# Patient Record
Sex: MALE | Race: WHITE | Employment: FULL TIME | URBAN - METROPOLITAN AREA
[De-identification: names, ages, dates, MRNs, and addresses within clinical notes are randomized per-mention and may not be internally consistent; named-entity substitution may affect disease eponyms.]

---

## 2017-02-10 ENCOUNTER — OFFICE VISIT (OUTPATIENT)
Dept: NEUROLOGY | Facility: CLINIC | Age: 22
End: 2017-02-10

## 2017-02-10 VITALS
SYSTOLIC BLOOD PRESSURE: 118 MMHG | RESPIRATION RATE: 18 BRPM | WEIGHT: 175 LBS | DIASTOLIC BLOOD PRESSURE: 76 MMHG | BODY MASS INDEX: 25.05 KG/M2 | OXYGEN SATURATION: 98 % | HEART RATE: 56 BPM | HEIGHT: 70 IN

## 2017-02-10 DIAGNOSIS — M54.12 CERVICAL RADICULOPATHY AT C7: Primary | ICD-10-CM

## 2017-02-10 DIAGNOSIS — M54.2 BILATERAL NECK PAIN: ICD-10-CM

## 2017-02-10 DIAGNOSIS — M54.6 ACUTE RIGHT-SIDED THORACIC BACK PAIN: ICD-10-CM

## 2017-02-10 PROCEDURE — 99204 OFFICE O/P NEW MOD 45 MIN: CPT | Performed by: PHYSICAL MEDICINE & REHABILITATION

## 2017-02-10 NOTE — PATIENT INSTRUCTIONS
Refill policies:    • Allow 2 business days for refills; controlled substances may take longer.   • Contact your pharmacy at least 5 days prior to running out of medication and have them send an electronic request or submit request through the “request re your physician has recommended that you have a procedure or additional testing performed. [facility]) will contact your insurance carrier to obtain pre-certification or prior authorization.     Unfortunately, CHARLIE has seen an increas

## 2017-02-10 NOTE — PROGRESS NOTES
Cervical Pain H & P    Chief Complaint:  Patient presents with:  Neck Pain: NP Neck pain onset appro 3 months. Pt had no traumatic event, just occurred. Pt pain starts in neck lower to right. Pt stated travel up neck, sometimes sharp pain.  Pt can feel pull ELBOW SURGERY         Family History   Family History   Problem Relation Age of Onset   • Diabetes Father        Social History     Social History   Marital Status: Single  Spouse Name: N/A    Years of Education: N/A  Number of Children: N/A     Occupation lymph nodes. .    Vitals:   02/10/17  1717   BP: 118/76   Pulse: 56   Resp: 18       Cervical Spine:    Posture: mild chin forward superiorly rotated protracted shoulder posture.    Shoulders: Level   Head: In neutral   Spinous Processes Palpations: Non-tend time.    He will follow up in 2-3 months. The patient understands and agrees with the stated plan. Sahara Diallo MD  2/10/2017

## 2017-02-13 ENCOUNTER — TELEPHONE (OUTPATIENT)
Dept: NEUROLOGY | Facility: CLINIC | Age: 22
End: 2017-02-13

## 2017-02-13 NOTE — TELEPHONE ENCOUNTER
[employer] for authorization of approval for MRI C-spine wo. Approval was given with Authorization # [id number] effective 02/13/17 to 03/14/17. Will call Pt. to  Inform. Left message advising of approval. Proceed with scheduling appt.

## 2017-02-21 ENCOUNTER — HOSPITAL ENCOUNTER (OUTPATIENT)
Dept: MRI IMAGING | Facility: HOSPITAL | Age: 22
Discharge: HOME | End: 2017-02-21
Attending: PHYSICAL MEDICINE & REHABILITATION
Payer: COMMERCIAL

## 2017-02-21 DIAGNOSIS — M54.6 ACUTE RIGHT-SIDED THORACIC BACK PAIN: ICD-10-CM

## 2017-02-21 DIAGNOSIS — M54.12 CERVICAL RADICULOPATHY AT C7: ICD-10-CM

## 2017-02-21 DIAGNOSIS — M54.2 BILATERAL NECK PAIN: ICD-10-CM

## 2017-02-21 PROCEDURE — 72141 MRI NECK SPINE W/O DYE: CPT

## 2017-02-22 ENCOUNTER — TELEPHONE (OUTPATIENT)
Dept: NEUROLOGY | Facility: CLINIC | Age: 22
End: 2017-02-22

## 2017-02-22 NOTE — TELEPHONE ENCOUNTER
Pt called left message on VM that MRI will be reviewed and he will be receiving call in rear further with interpretation and recommendations

## 2017-02-24 ENCOUNTER — TELEPHONE (OUTPATIENT)
Dept: NEUROLOGY | Facility: CLINIC | Age: 22
End: 2017-02-24

## 2017-02-24 NOTE — TELEPHONE ENCOUNTER
Pt called told that Dr Cara Cardozo has not reviewed, must given Dr Cara Cardozo at least one week. Told pt he will receive call back once reviewed.

## 2017-02-25 PROBLEM — M50.90 CERVICAL DISC DISEASE: Status: ACTIVE | Noted: 2017-02-25

## 2017-02-25 NOTE — TELEPHONE ENCOUNTER
He has 3 mild bulging discs in the cervical spine. He should do very well with the PT.   Please make sure that he has started the PT.

## 2017-02-27 NOTE — TELEPHONE ENCOUNTER
Patient informed of the below results and plan per Dr. Cedric Lyn. Patient verbalized understanding.

## 2017-02-28 ENCOUNTER — MED REC SCAN ONLY (OUTPATIENT)
Dept: NEUROLOGY | Facility: CLINIC | Age: 22
End: 2017-02-28

## 2017-03-03 ENCOUNTER — TELEPHONE (OUTPATIENT)
Dept: NEUROLOGY | Facility: CLINIC | Age: 22
End: 2017-03-03

## 2017-03-03 DIAGNOSIS — M50.90 CERVICAL DISC DISEASE: ICD-10-CM

## 2017-03-03 DIAGNOSIS — M54.6 ACUTE RIGHT-SIDED THORACIC BACK PAIN: ICD-10-CM

## 2017-03-03 DIAGNOSIS — M54.12 CERVICAL RADICULOPATHY AT C7: Primary | ICD-10-CM

## 2017-03-03 DIAGNOSIS — M54.2 BILATERAL NECK PAIN: ICD-10-CM

## 2017-03-03 NOTE — TELEPHONE ENCOUNTER
Verbal received from Dr. Miguelina Maria to contact patient and get condition update. Dr. Miguelina Maria attempted to contact Physical Therapist but was unable to get a hold of him. Left message for patient to call back with condition update.  (Patients mother is not liste

## 2017-03-06 NOTE — TELEPHONE ENCOUNTER
Received call from patients mom Lindseywilliam Bardales. Mireille Bardales was notified unfortunately she is not listed to discuss patients health information. Mireille Bardales verbalized understanding and would like us to contact patient.     Contacted patient who states he is currently in y

## 2017-03-06 NOTE — TELEPHONE ENCOUNTER
Reviewed below with Dr. Cara Cardozo who states set patient up for right C7-T1 ILESI (patient to decide on IVCS vs MAC sedation)  Order placed    Spoke to patient and informed him of the above. Patient would like IVCS.     Patient has been scheduled for a right C7

## 2017-03-07 ENCOUNTER — TELEPHONE (OUTPATIENT)
Dept: NEUROLOGY | Facility: CLINIC | Age: 22
End: 2017-03-07

## 2017-03-07 NOTE — TELEPHONE ENCOUNTER
Called [facility] BS for authorization of approval of right C7-T1 ILESI cpt code 45295. Talked to [address] [place] [address] who states no authorization is required. Reference # [id number]. Pt. Is scheduled for procedure on 03/14/17.

## 2017-03-14 ENCOUNTER — OFFICE VISIT (OUTPATIENT)
Dept: SURGERY | Facility: CLINIC | Age: 22
End: 2017-03-14

## 2017-03-14 DIAGNOSIS — M54.12 CERVICAL RADICULOPATHY AT C7: Primary | ICD-10-CM

## 2017-03-14 DIAGNOSIS — M50.90 CERVICAL DISC DISEASE: ICD-10-CM

## 2017-03-14 PROCEDURE — 62321 NJX INTERLAMINAR CRV/THRC: CPT | Performed by: PHYSICAL MEDICINE & REHABILITATION

## 2017-03-14 NOTE — PROCEDURES
Chan DIANE 7.    CERVICAL INTERLAMINAR  NAME:  Tricia Banerjee    MR #:    [id number] :  10/28/1995     PHYSICIAN:  Gely Nesbitt        Operative Report    DATE OF PROCEDURE: 3/14/2017   PREOPERATIVE DIAGNOSES: 1. right C7 radiculo instructions and will follow up in the clinic as scheduled. Throughout the whole procedure, the patient's pulse oximetry and vital signs were monitored and they remained completely stable.   Also, throughout the whole procedure, prior to injection of any m

## 2017-03-27 ENCOUNTER — TELEPHONE (OUTPATIENT)
Dept: NEUROLOGY | Facility: CLINIC | Age: 22
End: 2017-03-27

## 2017-03-27 DIAGNOSIS — M54.6 ACUTE RIGHT-SIDED THORACIC BACK PAIN: ICD-10-CM

## 2017-03-27 DIAGNOSIS — M54.2 BILATERAL NECK PAIN: ICD-10-CM

## 2017-03-27 DIAGNOSIS — M50.90 CERVICAL DISC DISEASE: ICD-10-CM

## 2017-03-27 DIAGNOSIS — M54.12 CERVICAL RADICULOPATHY AT C7: Primary | ICD-10-CM

## 2017-03-28 ENCOUNTER — TELEPHONE (OUTPATIENT)
Dept: NEUROLOGY | Facility: CLINIC | Age: 22
End: 2017-03-28

## 2017-03-28 NOTE — TELEPHONE ENCOUNTER
Reviewed with Dr. Allie Alamo who states patient can get repeat injections since he got about 50% relief. Patient informed of the above message from Dr. Allie Alamo and would like to proceed with repeat right C7-T1 ILESI under IVCS.  order placed     Patient has bee

## 2017-03-28 NOTE — TELEPHONE ENCOUNTER
Patient states he received about 50% relief from the right C7-T1 ILESI on 3/14/17. Patient says the pain score was 10/10 prior to the injection and now it is 5/10. Patient c/o neck  and upper back pain that increases with certain movements.  Patient is curr

## 2017-03-28 NOTE — TELEPHONE ENCOUNTER
Called [facility] BS for authorization of approval of right C7-T1 ILESI cpt code H7288192. Talked to [address] [place] [address]. who states no authorization is required. Reference # [id number]  Pt. is scheduled for procedure on 04/04/17.

## 2017-03-29 ENCOUNTER — TELEPHONE (OUTPATIENT)
Dept: NEUROLOGY | Facility: CLINIC | Age: 22
End: 2017-03-29

## 2017-03-29 NOTE — TELEPHONE ENCOUNTER
Spoke to patients mother Patricia Nicholson and informed her [facility] will be notified. Left detailed message informing Tutu Janeen at [phone] of the above.

## 2017-04-04 ENCOUNTER — OFFICE VISIT (OUTPATIENT)
Dept: SURGERY | Facility: CLINIC | Age: 22
End: 2017-04-04

## 2017-04-04 DIAGNOSIS — M50.90 CERVICAL DISC DISEASE: ICD-10-CM

## 2017-04-04 DIAGNOSIS — M54.12 CERVICAL RADICULOPATHY AT C7: Primary | ICD-10-CM

## 2017-04-04 PROCEDURE — 62321 NJX INTERLAMINAR CRV/THRC: CPT | Performed by: PHYSICAL MEDICINE & REHABILITATION

## 2017-04-04 PROCEDURE — 99152 MOD SED SAME PHYS/QHP 5/>YRS: CPT | Performed by: PHYSICAL MEDICINE & REHABILITATION

## 2017-04-04 NOTE — PROCEDURES
Chan DIANE 7.    CERVICAL INTERLAMINAR  NAME:  Ginna Webster    MR #:    [id number] :  10/28/1995     PHYSICIAN:  Mark Nesbitt        Operative Report    DATE OF PROCEDURE: 2017   PREOPERATIVE DIAGNOSES: 1. right C7 radiculop instructions and will follow up in the clinic as scheduled. Throughout the whole procedure, the patient's pulse oximetry and vital signs were monitored and they remained completely stable.   Also, throughout the whole procedure, prior to injection of any m

## 2017-05-11 ENCOUNTER — OFFICE VISIT (OUTPATIENT)
Dept: NEUROLOGY | Facility: CLINIC | Age: 22
End: 2017-05-11

## 2017-05-11 VITALS
BODY MASS INDEX: 24.34 KG/M2 | OXYGEN SATURATION: 98 % | DIASTOLIC BLOOD PRESSURE: 70 MMHG | WEIGHT: 170 LBS | RESPIRATION RATE: 16 BRPM | HEART RATE: 72 BPM | SYSTOLIC BLOOD PRESSURE: 112 MMHG | HEIGHT: 70 IN

## 2017-05-11 DIAGNOSIS — M50.90 CERVICAL DISC DISEASE: ICD-10-CM

## 2017-05-11 DIAGNOSIS — R29.898 SHOULDER WEAKNESS: ICD-10-CM

## 2017-05-11 DIAGNOSIS — G89.29 CHRONIC RIGHT-SIDED THORACIC BACK PAIN: ICD-10-CM

## 2017-05-11 DIAGNOSIS — M54.2 NECK PAIN ON RIGHT SIDE: Primary | ICD-10-CM

## 2017-05-11 DIAGNOSIS — M54.6 CHRONIC RIGHT-SIDED THORACIC BACK PAIN: ICD-10-CM

## 2017-05-11 PROBLEM — M54.12 CERVICAL RADICULOPATHY AT C7: Status: RESOLVED | Noted: 2017-02-10 | Resolved: 2017-05-11

## 2017-05-11 PROCEDURE — 99214 OFFICE O/P EST MOD 30 MIN: CPT | Performed by: PHYSICAL MEDICINE & REHABILITATION

## 2017-05-11 NOTE — PATIENT INSTRUCTIONS
Refill policies:    • Allow 2 business days for refills; controlled substances may take longer.   • Contact your pharmacy at least 5 days prior to running out of medication and have them send an electronic request or submit request through the “request re your physician has recommended that you have a procedure or additional testing performed. [facility]) will contact your insurance carrier to obtain pre-certification or prior authorization.     Unfortunately, CHARLIE has seen an increas

## 2017-05-11 NOTE — PROGRESS NOTES
HPI:william   Ivana Randolph is a 19year old male.      This is a 20 yo righthanded male here for f/u after Right C7-T1 inter laminal HANNA injection on 4/4/17 states he only received about 30% relief and gradual increasing right neck pain over past 3 weeks pain i Physical Therapy: current  Previous Spine Surgeries: never    No past medical history on file.       Past Surgical History    ELBOW SURGERY         Social History   Marital Status: Single  Spouse Name: N/A    Years of Education: N/A  Number of Children: N/A rotation: 90 degrees Painless     Vascular upper extremity:   Right radial pulses: 2+   Left radial pulses: 2+     Neurological Upper Extremity:    Light Touch: Intact in Bilateral upper extremities. Pin Prick: Not tested. UE Muscle Strength:  All Upper

## 2017-05-17 ENCOUNTER — APPOINTMENT (OUTPATIENT)
Dept: OCCUPATIONAL MEDICINE | Age: 22
End: 2017-05-17
Attending: FAMILY MEDICINE

## 2021-05-03 ENCOUNTER — APPOINTMENT (OUTPATIENT)
Age: 26
Setting detail: DERMATOLOGY
End: 2021-05-04

## 2021-05-03 DIAGNOSIS — L40.8 OTHER PSORIASIS: ICD-10-CM

## 2021-05-03 DIAGNOSIS — B02.9 ZOSTER WITHOUT COMPLICATIONS: ICD-10-CM

## 2021-05-03 PROCEDURE — OTHER COUNSELING: OTHER

## 2021-05-03 PROCEDURE — OTHER PRESCRIPTION: OTHER

## 2021-05-03 PROCEDURE — 99204 OFFICE O/P NEW MOD 45 MIN: CPT

## 2021-05-03 PROCEDURE — OTHER PRESCRIPTION MEDICATION MANAGEMENT: OTHER

## 2021-05-03 RX ORDER — BETAMETHASONE DIPROPIONATE 0.5 MG/ML
LOTION TOPICAL BID
Qty: 60 | Refills: 0 | Status: ERX | COMMUNITY
Start: 2021-05-03

## 2021-05-03 ASSESSMENT — LOCATION DETAILED DESCRIPTION DERM
LOCATION DETAILED: MID-FRONTAL SCALP
LOCATION DETAILED: T10 RIGHT ANTERIOR DERMATOME

## 2021-05-03 ASSESSMENT — LOCATION ZONE DERM
LOCATION ZONE: TRUNK
LOCATION ZONE: SCALP

## 2021-05-03 ASSESSMENT — LOCATION SIMPLE DESCRIPTION DERM
LOCATION SIMPLE: T10 RIGHT ANTERIOR DERMATOME
LOCATION SIMPLE: ANTERIOR SCALP

## 2021-05-03 NOTE — PROCEDURE: PRESCRIPTION MEDICATION MANAGEMENT
Plan: Since nearing the final stage, does not require treatment at this time. Pt verbalized understanding and defers any treatment at this time.
Render In Strict Bullet Format?: No
Detail Level: Simple

## 2021-05-03 NOTE — HPI: DRY SKIN
How Severe Is Your Dry Skin?: moderate
Additional History: Worried about psoriasis. Worse over the last couple months.

## (undated) NOTE — MR AVS SNAPSHOT
[facility]  [address], [address] [place] [address]  [address] [facility] [phone]               Thank you for choosing us for your health care visit with Polo San.  Man West MD.  We are glad to serve you and happy to provide you with this summary of you [facility] · [address], [address] · [address] [facility] · [address], [address] [place] [address] · [phone]    [facility] · [address] Ordaz [address] · 331 family member will be picking up prescription, office must be given name of individual in advance and they must present an ID as well. ? The name of the person picking up your prescription must be documented in your chart.   Scheduling Tests    If your phy This list is accurate as of: 5/11/17  2:24 PM.  Always use your most recent med list.                ADVIL OR   Take  by mouth.                    MyChart                  Visit [facility] online at  [email or web address]

## (undated) NOTE — Clinical Note
2/10/2017      Justin Nesbitt MD  Physical Medicine and Rehabilitation  [address] [address]  Dept: [phone]  Dept Fax: [phone]        RE: Consultation for Gill Jimenez        Dear Gerardo Urbina,    Thank you very m

## (undated) NOTE — Clinical Note
[facility] SURGERY SCHEDULING FORM   [address] [facility]   [phone] (scheduling phone) [phone] (scheduling fax)     PATIENT INFORMATION   Patient Name:    Homer De La Torre   :    10/28/1995 Completed by:    Aníbal Escobar      Date:    3/6/2017    [facility] will follow its Pre-Admission Assessment and Screening Policy for pre-admission testing.   Physicians that require   additional testing must order this directly and have results faxed to [facility] at

## (undated) NOTE — Clinical Note
[facility] SURGERY SCHEDULING FORM   [address] [facility]   [phone] (scheduling phone) [phone] (scheduling fax)     PATIENT INFORMATION   Patient Name:    Kassy Negrete   :    10/28/1995 Completed by:    Babak Lee      Date:    3/28/2017    [facility] will follow its Pre-Admission Assessment and Screening Policy for pre-admission testing.   Physicians that require   additional testing must order this directly and have results faxed to [facility] at

## (undated) NOTE — Clinical Note
5/11/2017      Rojelio Nesbitt MD  Physical Medicine and Rehabilitation  [address] [address]  Dept: [phone]  Dept Fax: [phone]        RE: Consultation for Susannah Mcnair        Dear Maynor Mckeon,    Thank you very m

## (undated) NOTE — Clinical Note
4/4/2017      Starr Nesbitt MD  Physical Medicine and Rehabilitation  [address] [address]  Dept: [phone]  Dept Fax: [phone]        RE: Consultation for Pranav Severino        Dear Rodney Monteiro,    Thank you very mu

## (undated) NOTE — MR AVS SNAPSHOT
[facility]  [address], [address] [place] [address]  [address] [facility] [phone]               Thank you for choosing us for your health care visit with Faye Patel.  Jasmina Case MD.  We are glad to serve you and happy to provide you with this summary of you family member will be picking up prescription, office must be given name of individual in advance and they must present an ID as well. ? The name of the person picking up your prescription must be documented in your chart.   Scheduling Tests    If your phy Return in about 3 months (around 5/10/2017).       Allergies as of Feb 10, 2017     Not on File                Today's Vital Signs     BP Pulse Height Weight BMI    118/76 mmHg 56 70\" 175 lb 25.11 kg/m2         Current Medications          This list is ac the patient's responsibility to check with and follow their insurance company's guidelines.  If your insurance company requires a prior authorization for a diagnostic test (such as MRI, MRA, CT, or Ultrasound), please do not schedule your exam until you hav thoracic back pain [M54.6], Bilateral neck pain [M54.2]          MyChart               Educational Information     Healthy Diet and Regular Exercise  [facility] for making healthy food choices  -   Enjoy your food, but eat less.

## (undated) NOTE — Clinical Note
3/14/2017      Anneliese Nesbitt MD  Physical Medicine and Rehabilitation  [address] [address]  Dept: [phone]  Dept Fax: [phone]        RE: Consultation for Bobby Caro        Dear Greg Lynne,    Thank you very m